# Patient Record
Sex: FEMALE | Race: WHITE | NOT HISPANIC OR LATINO | Employment: FULL TIME | URBAN - METROPOLITAN AREA
[De-identification: names, ages, dates, MRNs, and addresses within clinical notes are randomized per-mention and may not be internally consistent; named-entity substitution may affect disease eponyms.]

---

## 2017-01-23 ENCOUNTER — ALLSCRIPTS OFFICE VISIT (OUTPATIENT)
Dept: OTHER | Facility: OTHER | Age: 57
End: 2017-01-23

## 2017-06-29 ENCOUNTER — GENERIC CONVERSION - ENCOUNTER (OUTPATIENT)
Dept: OTHER | Facility: OTHER | Age: 57
End: 2017-06-29

## 2017-07-05 ENCOUNTER — ALLSCRIPTS OFFICE VISIT (OUTPATIENT)
Dept: OTHER | Facility: OTHER | Age: 57
End: 2017-07-05

## 2017-07-05 ENCOUNTER — TRANSCRIBE ORDERS (OUTPATIENT)
Dept: ADMINISTRATIVE | Facility: HOSPITAL | Age: 57
End: 2017-07-05

## 2017-07-05 DIAGNOSIS — Z12.31 VISIT FOR SCREENING MAMMOGRAM: Primary | ICD-10-CM

## 2018-01-12 NOTE — PROGRESS NOTES
Assessment    1  Carcinoma in situ of breast, unspecified laterality (233 0) (D05 90)    Plan  Carcinoma in situ of breast, unspecified laterality    · Tamoxifen Citrate 20 MG Oral Tablet; TAKE 1 TABLET DAILY   Rx By: Katina Sanchez; Dispense: 90 Days ; #:90 Tablet; Refill: 3; For: Carcinoma in situ of breast, unspecified laterality; JEFE = N; Verified Transmission to [facility] ; Last Updated By: SystemLayerGloss; 1/25/2016 9:09:13 AM   · Follow-up visit in 1 year Evaluation and Treatment  Follow-up  Status: Hold For -  Scheduling  Requested for: [id number]   Ordered; For: Carcinoma in situ of breast, unspecified laterality; Ordered By: Katina Sanchez Performed:  Due: [id number]    Discussion/Summary  Discussion Summary:   A 82-year-old female with 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, diagnosed in June 2012  She underwent lumpectomy, and resulting in MANE, followed by adjuvant hormonal therapy with tamoxifen with no significant side effects  She has no evidence of recurrent disease, based on her symptomatology and physical examinations  I recommended her to continue with tamoxifen 20 mg once a day  I will see her again in a year for routine followup  She is in agreement with my recommendations  Chief Complaint  Chief Complaint: Chief Complaint:   The patient presents to the office today with Ductal carcinoma in situ in the right breast  ER/NV positive disease diagnosed in June 2012  History of Present Illness  HPI: Ductal carcinoma in situ in the right breast  ER/NV positive disease diagnosed in June 2012  Current Therapy: Adjuvant hormonal therapy with tamoxifen 20 mg once a day since July 2012  Disease Status: MANE status post lumpectomy  Interval History: A 50year old female with ductal carcinoma in situ in the right breast, ER/NV positive disease diagnosed in June 2012  She underwent lumpectomy and resulting in MANE   She has been on adjuvant hormonal therapy with tamoxifen which initially caused significant hot flashes  She is status post simple hysterectomy at the age of 41  Prior to the tamoxifen, she never had hot flashes  With continued use of tamoxifen, her hot flashes, has markedly subsided  She came in today for routine followup  She still has mild hot flashes, a few times per week  She has no night sweats  Otherwise, she feels well  She denied swelling in the lower extremities  She has no respiratory symptoms  Her weight is stable  She has no complaint of pain  Her last mammography was in September 2015, which was probably benign  Her performance status is normal       Review of Systems  Complete-Female:   Constitutional: mild hot flashes, but as noted in HPI  Eyes: No complaints of eye pain, no red eyes, no eyesight problems, no discharge, no dry eyes, no itching of eyes  ENT: no complaints of earache, no loss of hearing, no nose bleeds, no nasal discharge, no sore throat, no hoarseness  Cardiovascular: No complaints of slow heart rate, no fast heart rate, no chest pain, no palpitations, no leg claudication, no lower extremity edema  Respiratory: No complaints of shortness of breath, no wheezing, no cough, no SOB on exertion, no orthopnea, no PND  Gastrointestinal: No complaints of abdominal pain, no constipation, no nausea or vomiting, no diarrhea, no bloody stools  Genitourinary: No complaints of dysuria, no incontinence, no pelvic pain, no dysmenorrhea, no vaginal discharge or bleeding  Musculoskeletal: No complaints of arthralgias, no myalgias, no joint swelling or stiffness, no limb pain or swelling  Integumentary: No complaints of skin rash or lesions, no itching, no skin wounds, no breast pain or lump  Neurological: No complaints of headache, no confusion, no convulsions, no numbness, no dizziness or fainting, no tingling, no limb weakness, no difficulty walking     Psychiatric: Not suicidal, no sleep disturbance, no anxiety or depression, no change in personality, no emotional problems  Endocrine: No complaints of proptosis, no hot flashes, no muscle weakness, no deepening of the voice, no feelings of weakness  Hematologic/Lymphatic: No complaints of swollen glands, no swollen glands in the neck, does not bleed easily, does not bruise easily  ROS Reviewed:   ROS reviewed  Active Problems    1  Abnormal finding on breast imaging (793 89) (R92 8)   2  Carcinoma in situ of breast, unspecified laterality (233 0) (D05 90)   3  Hyperlipidemia (272 4) (E78 5)   4  Routine Gynecological Exam With Cervical Pap Smear (V72 31)   5  Use of tamoxifen (Nolvadex) (V07 51) (Z79 810)    Past Medical History    1  History of Age At First Period 17 Years Old (Menarche)   2  History of Chest wall hematoma (922 1) (S20 219A)   3  History of Easy Bruising Tendency   4  History of environmental allergies (V15 09) (Z91 09)   5  History of fracture of clavicle (V15 51) (Z87 81)   6  History of pregnancy (V13 29)   7  History of Radiation Therapy (V15 3)   8  History of Summary Of Previous Pregnancies  2  (Total No )   9  History of Summary Of Previous Pregnancies Para 2  (Deliveries)    Surgical History    1  History of Oral Surgery Tooth Extraction   2  History of Primary Repair Of Knee Ligament Cruciate Anterior   3  History of Right Breast Lumpectomy   4  History of Supracervical Hysterectomy Laparoscopic    Family History    1  Family history of Malignant Melanoma Of The Skin (V16 8)    2  Family history of Hypercholesterolemia   3  Family history of Hypertension (V17 49)    Social History    · Alcohol Use (History)   · Marital History - Currently    · Never A Smoker    Current Meds   1  Allegra TABS; as needed; Therapy: (Zully Mathew) to Recorded   2  Astelin 137 MCG/SPRAY SOLN; as needed; Therapy: (Zully Sparabel) to Recorded   3  Caltrate 600+D CHEW;   Therapy: (Recorded:[id number]) to Recorded   4   Anup Blamer 0 2 % Ophthalmic Solution; Therapy: (Rosaura Varela) to Recorded   5  Tamoxifen Citrate 20 MG Oral Tablet; TAKE 1 TABLET DAILY  Requested for: [id number]; Last Rx:26Jan2015 Ordered    Allergies    1  No Known Drug Allergies    2  Nuts    Vitals  Vital Signs [Data Includes: Current Encounter]    Recorded: 70CBK1737 08:52AM   Temperature 98 6 F   Heart Rate 114   Respiration 16   Systolic 426   Diastolic 70   Height 5 ft 0 5 in   Weight 145 lb    BMI Calculated 27 85   BSA Calculated 1 64   O2 Saturation 100     Physical Exam    Constitutional   General appearance: No acute distress, well appearing and well nourished  Eyes   Conjunctiva and lids: No swelling, erythema or discharge  Pupils and irises: Equal, round and reactive to light  Ears, Nose, Mouth, and Throat   External inspection of ears and nose: Normal     Otoscopic examination: Tympanic membranes translucent with normal light reflex  Canals patent without erythema  Nasal mucosa, septum, and turbinates: Normal without edema or erythema  Oropharynx: Normal with no erythema, edema, exudate or lesions  Pulmonary   Respiratory effort: No increased work of breathing or signs of respiratory distress  Auscultation of lungs: Clear to auscultation  Cardiovascular   Palpation of heart: Normal PMI, no thrills  Auscultation of heart: Normal rate and rhythm, normal S1 and S2, without murmurs  Examination of extremities for edema and/or varicosities: Normal     Carotid pulses: Normal     Abdomen   Abdomen: Non-tender, no masses  Liver and spleen: No hepatomegaly or splenomegaly  Lymphatic   Palpation of lymph nodes in neck: No lymphadenopathy  Musculoskeletal   Gait and station: Normal     Digits and nails: Normal without clubbing or cyanosis  Inspection/palpation of joints, bones, and muscles: Normal     Skin   Skin and subcutaneous tissue: Normal without rashes or lesions      Neurologic   Cranial nerves: Cranial nerves 2-12 intact  Reflexes: 2+ and symmetric  Sensation: No sensory loss  Psychiatric   Orientation to person, place, and time: Normal     Mood and affect: Normal     Additional Exam:  breast exam; She has a well healed lumpectomy scar at the supraareolar area of the right breast  No palpable mass or nodule in the right breast  Left breast is negative  Diabetic Foot Screen: Normal       ECOG 0       Results/Data  Results Form:   Results   Radiology mammography on September 2015 was probably benign  BI-RAD 3        Signatures   Electronically signed by : FLROY Escudero ; Jan 25 2016  9:11AM EST                       (Author)

## 2018-01-13 VITALS
RESPIRATION RATE: 14 BRPM | HEART RATE: 112 BPM | WEIGHT: 162 LBS | OXYGEN SATURATION: 99 % | SYSTOLIC BLOOD PRESSURE: 120 MMHG | BODY MASS INDEX: 30.58 KG/M2 | HEIGHT: 61 IN | DIASTOLIC BLOOD PRESSURE: 70 MMHG

## 2018-01-14 VITALS
SYSTOLIC BLOOD PRESSURE: 124 MMHG | BODY MASS INDEX: 30.96 KG/M2 | WEIGHT: 164 LBS | OXYGEN SATURATION: 98 % | RESPIRATION RATE: 13 BRPM | HEIGHT: 61 IN | DIASTOLIC BLOOD PRESSURE: 78 MMHG | TEMPERATURE: 100.1 F | HEART RATE: 108 BPM

## 2018-07-02 ENCOUNTER — HOSPITAL ENCOUNTER (OUTPATIENT)
Dept: MAMMOGRAPHY | Facility: MEDICAL CENTER | Age: 58
Discharge: HOME | End: 2018-07-02
Payer: COMMERCIAL

## 2018-07-02 DIAGNOSIS — Z12.31 ENCOUNTER FOR SCREENING MAMMOGRAM FOR MALIGNANT NEOPLASM OF BREAST: ICD-10-CM

## 2018-07-02 PROCEDURE — 77067 SCR MAMMO BI INCL CAD: CPT

## 2018-07-02 PROCEDURE — 77063 BREAST TOMOSYNTHESIS BI: CPT

## 2018-07-09 ENCOUNTER — OFFICE VISIT (OUTPATIENT)
Dept: SURGICAL ONCOLOGY | Facility: CLINIC | Age: 58
End: 2018-07-09
Payer: COMMERCIAL

## 2018-07-09 VITALS
BODY MASS INDEX: 33.61 KG/M2 | DIASTOLIC BLOOD PRESSURE: 90 MMHG | WEIGHT: 171.2 LBS | TEMPERATURE: 98.8 F | SYSTOLIC BLOOD PRESSURE: 140 MMHG | HEIGHT: 60 IN | HEART RATE: 98 BPM | RESPIRATION RATE: 14 BRPM

## 2018-07-09 DIAGNOSIS — D05.11 DUCTAL CARCINOMA IN SITU (DCIS) OF RIGHT BREAST: Primary | ICD-10-CM

## 2018-07-09 DIAGNOSIS — Z12.39 BREAST CANCER SCREENING, HIGH RISK PATIENT: ICD-10-CM

## 2018-07-09 PROCEDURE — 99214 OFFICE O/P EST MOD 30 MIN: CPT | Performed by: SURGERY

## 2018-07-09 RX ORDER — OLOPATADINE HYDROCHLORIDE 2 MG/ML
SOLUTION/ DROPS OPHTHALMIC
COMMUNITY

## 2018-07-09 RX ORDER — CA/D3/MAG OX/ZINC/COP/MANG/BOR 600 MG-800
TABLET,CHEWABLE ORAL
COMMUNITY

## 2018-07-09 RX ORDER — IBUPROFEN 200 MG
200 TABLET ORAL EVERY 6 HOURS
COMMUNITY

## 2018-07-09 RX ORDER — AZELASTINE 1 MG/ML
SPRAY, METERED NASAL
COMMUNITY

## 2018-07-09 RX ORDER — FEXOFENADINE HCL 180 MG/1
TABLET ORAL
COMMUNITY

## 2018-07-09 NOTE — PROGRESS NOTES
Surgical Oncology Follow Up       [address]  [facility] [address] [facility] [address]    Christian Hinds  1960  [id number]  [address]  [facility] [place]  [address] [place] [id number]    Chief Complaint   Patient presents with    Breast Cancer     1 yr follow up        Assessment/Plan   Diagnoses and all orders for this visit:    Ductal carcinoma in situ (DCIS) of right breast    Breast cancer screening, high risk patient  -     Mammo screening bilateral w 3d & cad; Future    Other orders  -     fexofenadine (ALLEGRA ALLERGY) 180 MG tablet; Take by mouth  -     azelastine (ASTELIN) 0 1 % nasal spray; into each nostril  -     Calcium Carbonate-Vit D-Min (CALTRATE 600+D PLUS MINERALS) 600-800 MG-UNIT CHEW; Chew  -     Esomeprazole Magnesium (NEXIUM PO); Take 20 mg by mouth  -     ibuprofen (MOTRIN) 200 mg tablet; Take 200 mg by mouth every 6 (six) hours  -     olopatadine HCl (PATADAY) 0 2 % opth drops; Apply to eye        Advance Care Planning/Advance Directives:  Did not discuss  with the patient  Oncology History:       Ductal carcinoma in situ (DCIS) of right breast     Initial Diagnosis     Ductal carcinoma in situ (DCIS) of right breast       5/16/2012 Biopsy     Right breast biopsy,  DCIS          Genetic Testing     BRCA/THELMA negative         6/20/2012 Surgery     Right breast lumpectomy         2012 -  Radiation     WBRT  Dr Dodge Favorite,  Dr Hutson Solid     Tamoxifen  Dr Thalia Heredia            History of Present Illness: breast cancer follow up  -Interval History:none    Review of Systems:  Review of Systems   Constitutional: Negative  Negative for appetite change and fever  Eyes: Negative  Respiratory: Negative for shortness of breath  Cardiovascular: Negative  Gastrointestinal: Negative  Endocrine: Negative  Genitourinary: Negative      Musculoskeletal: Negative  Negative for arthralgias and myalgias  Skin: Negative  Allergic/Immunologic: Negative  Neurological: Negative  Hematological: Negative  Negative for adenopathy  Does not bruise/bleed easily  Psychiatric/Behavioral: Negative  Patient Active Problem List   Diagnosis    Ductal carcinoma in situ (DCIS) of right breast     Past Medical History:   Diagnosis Date    Easy bruising     Environmental allergies     Fracture, clavicle     Hx of radiation therapy     Hyperlipidemia     Use of tamoxifen (Nolvadex)      Past Surgical History:   Procedure Laterality Date    BREAST LUMPECTOMY Right 06/20/2012    HYSTERECTOMY      SUPRACERVICAL    KNEE SURGERY      WISDOM TOOTH EXTRACTION       Family History   Problem Relation Age of Onset    Skin cancer Mother         AGE DX UNK     Social History     Social History    Marital status: Single     Spouse name: N/A    Number of children: N/A    Years of education: N/A     Occupational History    Not on file       Social History Main Topics    Smoking status: Never Smoker    Smokeless tobacco: Never Used    Alcohol use Yes    Drug use: No    Sexual activity: Not on file     Other Topics Concern    Not on file     Social History Narrative    No narrative on file       Current Outpatient Prescriptions:     azelastine (ASTELIN) 0 1 % nasal spray, into each nostril, Disp: , Rfl:     Calcium Carbonate-Vit D-Min (CALTRATE 600+D PLUS MINERALS) 600-800 MG-UNIT CHEW, Chew, Disp: , Rfl:     Esomeprazole Magnesium (NEXIUM PO), Take 20 mg by mouth, Disp: , Rfl:     fexofenadine (ALLEGRA ALLERGY) 180 MG tablet, Take by mouth, Disp: , Rfl:     ibuprofen (MOTRIN) 200 mg tablet, Take 200 mg by mouth every 6 (six) hours, Disp: , Rfl:     olopatadine HCl (PATADAY) 0 2 % opth drops, Apply to eye, Disp: , Rfl:   Allergies   Allergen Reactions    Nuts Other (See Comments)     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 The following portions of the patient's history were reviewed and updated as appropriate: allergies, current medications, past family history, past medical history, past social history, past surgical history and problem list         Vitals:    07/09/18 0818   BP: 140/90   Pulse: 98   Resp: 14   Temp: 98 8 °F (37 1 °C)       Physical Exam   Constitutional: She is oriented to person, place, and time  She appears well-developed and well-nourished  HENT:   Head: Normocephalic and atraumatic  Cardiovascular: Normal heart sounds  Pulmonary/Chest: Breath sounds normal  Right breast exhibits skin change (lumpectomy scar)  Right breast exhibits no inverted nipple, no mass, no nipple discharge and no tenderness  Left breast exhibits no inverted nipple, no mass, no nipple discharge, no skin change and no tenderness  Abdominal: Soft  Lymphadenopathy:        Right axillary: No pectoral and no lateral adenopathy present  Left axillary: No pectoral and no lateral adenopathy present  Right: No supraclavicular adenopathy present  Left: No supraclavicular adenopathy present  Neurological: She is alert and oriented to person, place, and time  Psychiatric: She has a normal mood and affect  Results:      Imaging  7/2/18 3d screen mamm benign; B2 density 2    I reviewed the above imaging data  Discussion/Summary:  53-year-old female status post right breast conservation for ductal carcinoma in situ  There is no evidence of disease based on examination today or recent mammogram   I will continue to see her on an annual basis or sooner should the need arise

## 2019-08-07 ENCOUNTER — HOSPITAL ENCOUNTER (OUTPATIENT)
Dept: MAMMOGRAPHY | Facility: MEDICAL CENTER | Age: 59
Discharge: HOME | End: 2019-08-07
Payer: COMMERCIAL

## 2019-08-07 VITALS — BODY MASS INDEX: 32.28 KG/M2 | WEIGHT: 171 LBS | HEIGHT: 61 IN

## 2019-08-07 DIAGNOSIS — Z12.39 BREAST CANCER SCREENING, HIGH RISK PATIENT: ICD-10-CM

## 2019-08-07 PROCEDURE — 77067 SCR MAMMO BI INCL CAD: CPT

## 2019-08-07 PROCEDURE — 77063 BREAST TOMOSYNTHESIS BI: CPT

## 2019-09-09 ENCOUNTER — OFFICE VISIT (OUTPATIENT)
Dept: SURGICAL ONCOLOGY | Facility: CLINIC | Age: 59
End: 2019-09-09
Payer: COMMERCIAL

## 2019-09-09 VITALS
SYSTOLIC BLOOD PRESSURE: 100 MMHG | HEIGHT: 61 IN | BODY MASS INDEX: 34.32 KG/M2 | TEMPERATURE: 98.8 F | DIASTOLIC BLOOD PRESSURE: 80 MMHG | WEIGHT: 181.8 LBS | HEART RATE: 102 BPM | RESPIRATION RATE: 18 BRPM

## 2019-09-09 DIAGNOSIS — Z12.39 BREAST CANCER SCREENING, HIGH RISK PATIENT: Primary | ICD-10-CM

## 2019-09-09 PROCEDURE — 99214 OFFICE O/P EST MOD 30 MIN: CPT | Performed by: SURGERY

## 2019-09-09 RX ORDER — LANSOPRAZOLE 15 MG/1
15 CAPSULE, DELAYED RELEASE ORAL DAILY
COMMUNITY

## 2019-09-09 RX ORDER — AMLODIPINE BESYLATE 5 MG/1
5 TABLET ORAL DAILY
COMMUNITY
Start: 2019-04-03 | End: 2020-04-02

## 2019-09-09 NOTE — PROGRESS NOTES
Surgical Oncology Follow Up       [facility]  [facility] SUR[facility]COL[facility] [address]    Monica Duff  1960  [id number]  [address]  [facility] [place]  [place] [id number]    Chief Complaint   Patient presents with    Follow-up       Assessment/Plan   Diagnoses and all orders for this visit:    Breast cancer screening, high risk patient  -     Mammo screening bilateral w 3d & cad; Future    Other orders  -     amLODIPine (NORVASC) 5 mg tablet; Take 5 mg by mouth daily  -     lansoprazole (PREVACID) 15 mg capsule; Take 15 mg by mouth daily  -     metoprolol tartrate (LOPRESSOR) 25 mg tablet; Take 25 mg by mouth 2 (two) times a day        Advance Care Planning/Advance Directives:  Discussed disease status, cancer treatment plans and/or cancer treatment goals with the patient  Oncology History:       Ductal carcinoma in situ (DCIS) of right breast     Initial Diagnosis     Ductal carcinoma in situ (DCIS) of right breast      5/16/2012 Biopsy     Right breast biopsy,  DCIS       Genetic Testing     BRCA/THELMA negative      6/20/2012 Surgery     Right breast lumpectomy      2012 -  Radiation     [facility]RT  Dr Guthrie Gilboa,  Dr [facility]     Tamoxifen  Dr Cevallos [place]         History of Present Illness: breast cancer follow up   -Interval History: reports a concern on CT done to rule out PE    Review of Systems:  Review of Systems   Constitutional: Negative  Negative for appetite change and fever  Eyes: Negative  Respiratory: Negative for shortness of breath  Cardiovascular: Negative  Gastrointestinal: Negative  Endocrine: Negative  Genitourinary: Negative  Musculoskeletal: Negative  Negative for arthralgias and myalgias  Skin: Negative  Allergic/Immunologic: Negative  Neurological: Negative  Hematological: Negative  Negative for adenopathy   Does not bruise/bleed easily  Psychiatric/Behavioral: Negative  Patient Active Problem List   Diagnosis    Ductal carcinoma in situ (DCIS) of right breast    Breast cancer screening, high risk patient     Past Medical History:   Diagnosis Date    BRCA1 negative     Breast cancer ([facility] [address] ) 2012    Easy bruising     Environmental allergies     Fracture, clavicle     Hx of radiation therapy     Hyperlipidemia     Use of tamoxifen (Nolvadex)      Past Surgical History:   Procedure Laterality Date    BREAST BIOPSY Right 2012    Malignant    BREAST LUMPECTOMY Right 06/20/2012    Malignant    HYSTERECTOMY      SUPRACERVICAL    KNEE SURGERY      WISDOM TOOTH EXTRACTION       Family History   Problem Relation Age of Onset   Ashley Griffin Melanoma Mother     No Known Problems Father     No Known Problems Maternal Grandmother     No Known Problems Maternal Grandfather     No Known Problems Paternal Grandmother     No Known Problems Paternal Grandfather     No Known Problems Paternal Aunt     Melanoma Cousin      Social History     Socioeconomic History    Marital status: Single     Spouse name: Not on file    Number of children: Not on file    Years of education: Not on file    Highest education level: Not on file   Occupational History    Not on file   Social Needs    Financial resource strain: Not on file    Food insecurity:     Worry: Not on file     Inability: Not on file    Transportation needs:     Medical: Not on file     Non-medical: Not on file   Tobacco Use    Smoking status: Never Smoker    Smokeless tobacco: Never Used   Substance and Sexual Activity    Alcohol use:  Yes    Drug use: No    Sexual activity: Not on file   Lifestyle    Physical activity:     Days per week: Not on file     Minutes per session: Not on file    Stress: Not on file   Relationships    Social connections:     Talks on phone: Not on file     Gets together: Not on file     Attends [employer] service: Not on file     Active member of club or organization: Not on file     Attends meetings of clubs or organizations: Not on file     Relationship status: Not on file    Intimate partner violence:     Fear of current or ex partner: Not on file     Emotionally abused: Not on file     Physically abused: Not on file     Forced sexual activity: Not on file   Other Topics Concern    Not on file   Social History Narrative    Not on file       Current Outpatient Medications:     amLODIPine (NORVASC) 5 mg tablet, Take 5 mg by mouth daily, Disp: , Rfl:     azelastine (ASTELIN) 0 1 % nasal spray, into each nostril, Disp: , Rfl:     Calcium Carbonate-Vit D-Min (CALTRATE 600+D PLUS MINERALS) 600-800 MG-UNIT CHEW, Chew, Disp: , Rfl:     fexofenadine (ALLEGRA ALLERGY) 180 MG tablet, Take by mouth, Disp: , Rfl:     lansoprazole (PREVACID) 15 mg capsule, Take 15 mg by mouth daily, Disp: , Rfl:     metoprolol tartrate (LOPRESSOR) 25 mg tablet, Take 25 mg by mouth 2 (two) times a day, Disp: , Rfl:     Esomeprazole Magnesium (NEXIUM PO), Take 20 mg by mouth, Disp: , Rfl:     ibuprofen (MOTRIN) 200 mg tablet, Take 200 mg by mouth every 6 (six) hours, Disp: , Rfl:     olopatadine HCl (PATADAY) 0 2 % opth drops, Apply to eye, Disp: , Rfl:   Allergies   Allergen Reactions    Nuts Other (See Comments)     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        The following portions of the patient's history were reviewed and updated as appropriate: allergies, current medications, past family history, past medical history, past social history, past surgical history and problem list         Vitals:    09/09/19 1110   BP: 100/80   Pulse: 102   Resp: 18   Temp: 98 8 °F (37 1 °C)       Physical Exam   Constitutional: She is oriented to person, place, and time  She appears well-developed and well-nourished  HENT:   Head: Normocephalic and atraumatic  Cardiovascular: Normal heart sounds     Pulmonary/Chest: Breath sounds normal  Right breast exhibits skin change (lumpectomy scar)  Right breast exhibits no inverted nipple, no mass, no nipple discharge and no tenderness  Left breast exhibits no inverted nipple, no mass, no nipple discharge, no skin change and no tenderness  Abdominal: Soft  Lymphadenopathy:        Right axillary: No pectoral and no lateral adenopathy present  Left axillary: No pectoral and no lateral adenopathy present  Right: No supraclavicular adenopathy present  Left: No supraclavicular adenopathy present  Neurological: She is alert and oriented to person, place, and time  Psychiatric: She has a normal mood and affect  Results:      Imaging  08/07/2019 bilateral 3D screening mammogram is benign BI-RADS two with a density of one  02/22/2019 CT of the chest to rule out a PE showed a subcentimeter pleural-based nodule with calcification likely benign however follow-up was recommended in 6-12 months      I reviewed the above imaging data  Discussion/Summary:  61-year-old female status post right breast conservation for ductal carcinoma in situ  She did have whole breast radiation and took tamoxifen  There is no evidence of disease based on examination today or recent mammogram   I did review the CT scan findings  This is likely benign in nature  Her insurance would not cover the short-term follow-up  I discussed potentially try again at the one year point to do a follow-up CT scan  This is likely unrelated to her breast cancer diagnosis  I will make arrangements for mammogram and exam in one year  I will see her again sooner should the need arise

## 2020-08-06 ENCOUNTER — TRANSCRIBE ORDERS (OUTPATIENT)
Dept: ADMINISTRATIVE | Facility: HOSPITAL | Age: 60
End: 2020-08-06

## 2020-08-06 DIAGNOSIS — Z12.31 ENCOUNTER FOR SCREENING MAMMOGRAM FOR MALIGNANT NEOPLASM OF BREAST: Primary | ICD-10-CM

## 2020-11-27 ENCOUNTER — TELEPHONE (OUTPATIENT)
Dept: SURGICAL ONCOLOGY | Facility: CLINIC | Age: 60
End: 2020-11-27

## 2021-01-25 ENCOUNTER — HOSPITAL ENCOUNTER (OUTPATIENT)
Dept: MAMMOGRAPHY | Facility: MEDICAL CENTER | Age: 61
Discharge: HOME | End: 2021-01-25
Payer: COMMERCIAL

## 2021-01-25 VITALS — HEIGHT: 61 IN | BODY MASS INDEX: 34.17 KG/M2 | WEIGHT: 181 LBS

## 2021-01-25 DIAGNOSIS — Z12.31 ENCOUNTER FOR SCREENING MAMMOGRAM FOR MALIGNANT NEOPLASM OF BREAST: ICD-10-CM

## 2021-01-25 PROCEDURE — 77063 BREAST TOMOSYNTHESIS BI: CPT

## 2021-01-25 PROCEDURE — 77067 SCR MAMMO BI INCL CAD: CPT

## 2021-12-07 ENCOUNTER — TELEPHONE (OUTPATIENT)
Dept: HEMATOLOGY ONCOLOGY | Facility: CLINIC | Age: 61
End: 2021-12-07